# Patient Record
(demographics unavailable — no encounter records)

---

## 2025-04-21 NOTE — ASSESSMENT
[FreeTextEntry1] : 25 year old female with PMH of cyclical vomiting syndrome, EDS is presenting for elevated prolactin level.    1. Elevated prolactin level  -Level 33 per patient.  -Patient has regular periods and denies galactorrhea, visual disturbances. Differentials for mild elevated in prolactin discussed including stress, pregnancy, medications, hypothyroidism, pituitary adenoma or idiopathic once all other causes have been ruled out. Will check pituitary hormones and repeat TSH and prolactin including macroprolactin. Discussed that we may need to do a pituitary MRI if needed for possible pituitary adenoma.   Patient verbalized understanding of the above. All questions were answered to patient's satisfaction. Dispo: Patient will follow up in 2 weeks TTM to go over results.

## 2025-04-21 NOTE — PHYSICAL EXAM
[Alert] : alert [Well Nourished] : well nourished [EOMI] : extra ocular movement intact [No Neck Mass] : no neck mass was observed [No Respiratory Distress] : no respiratory distress [Normal Rate] : heart rate was normal [Not Distended] : not distended [Normal Gait] : normal gait [No Tremors] : no tremors [Oriented x3] : oriented to person, place, and time [Normal Affect] : the affect was normal [Normal Mood] : the mood was normal

## 2025-04-21 NOTE — HISTORY OF PRESENT ILLNESS
[FreeTextEntry1] : Ms. Dunn is presenting for elevated prolactin level.   25 year old female with PMH of cyclical vomiting syndrome, EDS.   #Elevated Prolactin  FH positive for hypothyroidism and hyperthyroidism.  Notes level was 33 per patient.  Pt denies use of anti psychotics (ie risperdone), antiemetics or opioids. She denies pregnancy, stress, head trauma, renal failure. No h/o thyroid disease. ROS negative for galactorrhea, visual disturbances, oligomenorrhea, amenorrhea, chest wall injury. LMP occurs at least 8x/year.

## 2025-05-06 NOTE — ASSESSMENT
[FreeTextEntry1] : 25 year old female with PMH of cyclical vomiting syndrome, EDS is presenting for elevated prolactin level.    1. Elevated prolactin level  -Normal prolactin  -Patient has regular periods and denies galactorrhea, visual disturbances. Differentials for mild elevated in prolactin discussed including stress, pregnancy, medications, hypothyroidism, pituitary adenoma or idiopathic once all other causes have been ruled out.    Patient verbalized understanding of the above. All questions were answered to patient's satisfaction. Dispo: Patient will follow up as needed.

## 2025-05-06 NOTE — HISTORY OF PRESENT ILLNESS
[FreeTextEntry1] : Ms. Dunn is presenting for elevated prolactin level.   25 year old female with PMH of cyclical vomiting syndrome, EDS.   #Elevated Prolactin  FH positive for hypothyroidism and hyperthyroidism.  Notes level was 33 per patient.  Pt denies use of anti psychotics (ie risperdone), antiemetics or opioids. She denies pregnancy, stress, head trauma, renal failure. No h/o thyroid disease. ROS negative for galactorrhea, visual disturbances, oligomenorrhea, amenorrhea, chest wall injury. LMP occurs at least 8x/year.   Interval hx:  Discussed all labs with patient.